# Patient Record
Sex: MALE | Race: WHITE
[De-identification: names, ages, dates, MRNs, and addresses within clinical notes are randomized per-mention and may not be internally consistent; named-entity substitution may affect disease eponyms.]

---

## 2021-03-01 ENCOUNTER — HOSPITAL ENCOUNTER (EMERGENCY)
Dept: HOSPITAL 56 - MW.ED | Age: 36
Discharge: HOME | End: 2021-03-01
Payer: COMMERCIAL

## 2021-03-01 DIAGNOSIS — R06.00: ICD-10-CM

## 2021-03-01 DIAGNOSIS — R07.89: Primary | ICD-10-CM

## 2021-03-01 DIAGNOSIS — R11.0: ICD-10-CM

## 2021-03-01 DIAGNOSIS — H92.02: ICD-10-CM

## 2021-03-01 LAB
BUN SERPL-MCNC: 20 MG/DL (ref 7–18)
CHLORIDE SERPL-SCNC: 102 MMOL/L (ref 98–107)
CO2 SERPL-SCNC: 24.3 MMOL/L (ref 21–32)
GLUCOSE SERPL-MCNC: 119 MG/DL (ref 74–106)
POTASSIUM SERPL-SCNC: 3.6 MMOL/L (ref 3.5–5.1)
SODIUM SERPL-SCNC: 139 MMOL/L (ref 136–148)

## 2021-03-01 PROCEDURE — 83735 ASSAY OF MAGNESIUM: CPT

## 2021-03-01 PROCEDURE — 36415 COLL VENOUS BLD VENIPUNCTURE: CPT

## 2021-03-01 PROCEDURE — 93005 ELECTROCARDIOGRAM TRACING: CPT

## 2021-03-01 PROCEDURE — 71045 X-RAY EXAM CHEST 1 VIEW: CPT

## 2021-03-01 PROCEDURE — 85610 PROTHROMBIN TIME: CPT

## 2021-03-01 PROCEDURE — 80048 BASIC METABOLIC PNL TOTAL CA: CPT

## 2021-03-01 PROCEDURE — 99285 EMERGENCY DEPT VISIT HI MDM: CPT

## 2021-03-01 PROCEDURE — 85379 FIBRIN DEGRADATION QUANT: CPT

## 2021-03-01 PROCEDURE — 84484 ASSAY OF TROPONIN QUANT: CPT

## 2021-03-01 PROCEDURE — 85025 COMPLETE CBC W/AUTO DIFF WBC: CPT

## 2021-03-01 NOTE — EDM.PDOC
ED HPI GENERAL MEDICAL PROBLEM





- General


Chief Complaint: Chest Pain


Stated Complaint: CHEST PAIN


Time Seen by Provider: 03/01/21 19:12





- History of Present Illness


INITIAL COMMENTS - FREE TEXT/NARRATIVE: 





CHIEF COMPLAINT(S): "I suddenly could not catch my breath "








HISTORY OF PRESENT ILLNESS: This is a 35-year-old man with a past medical 

history of Covid approximately 4 to 5 weeks ago who comes to the emergency 

department with a chief complaint of "I suddenly cannot catch my breath."  The 

patient states that he was sitting at his house and lying down when he suddenly 

could not catch his breath.  He states that it just felt like he could not take 

a full breath.  He states that at time he also felt some left-sided sharp chest 

pain rated 7-8 out of 10 associated with shortness of breath and nausea but 

denied any diaphoresis or vomiting.  He states that he went outside to the cold 

air but that did not seem to help his situation.  He denies any numbness, 

tingling, weakness, or headache.  He states that when this happened his ears got

red.  He states that this all started around 3 PM when he had similar symptoms. 

He denies any syncope, history of CAD or CHF.  He denies any recent travel or 

recent surgery or prior history of DVT or PE.  He does not know his family 

history given that he is adopted.  Of note: He had a earache on the left side 

last night which he took ibuprofen and it improved.  He denies any fevers or 

chills or cough.





 


REVIEW OF SYSTEMS: 





Constitutional: Denies fever, chills.


Eyes: Denies eye pain


Ears, Nose, Mouth, & Throat: Positive for left-sided earache


Cardiovascular: Positive for left-sided chest pain


Respiratory: Positive for shortness of breath.  Denies cough


Gastrointestinal: Positive for nausea.  Denies vomiting, diarrhea, hematochezia


Genitourinary: Denies hematuria


Skin:Denies a rash


MSK: Denies joint pain


Neurological: Denies blurred vision, numbness, tingling, weakness


Psychiatric: Denies depression








PAST MEDICAL HISTORY: As per history of present illness and as reviewed below 

otherwise noncontributory.





SURGICAL HISTORY: As per history of present illness and as reviewed below 

otherwise noncontributory.





SOCIAL HISTORY: As per history of present illness and as reviewed below 

otherwise noncontributory.





FAMILY HISTORY: As per history of present illness and as reviewed below 

otherwise noncontributory.








EXAMINATION OF ORGAN SYSTEMS/BODY AREAS: 





Constitutional: Blood pressure was 155/77, heart rate 104, respiratory rate 18 

with an oxygen saturation of 97% on room air.  Temperature 36.6


General: Young man who does not appear to be in acute distress


Psychiatric: Appears anxious but appropriate mood


Eyes: No scleral icterus or conjunctival erythema pupils are equal round 

reactive to light.


ENMT: Moist mucous membranes. No pharyngeal erythema no tonsillar exudates or 

swelling.  Bilateral tympanic membranes without any bulging or erythema.  

Bilateral nasal turbinates are clear without any drainage.


Cardiovascular: Tachycardic but regular.  No gallops, murmurs, or rubs.  

Bilateral upper extremity pulses symmetric and intact.  No peripheral edema.  No

JVD.


Respiratory: Lungs clear to auscultation bilaterally.  No wheezes, rales, or 

rhonchi.


Gastrointestinal: Soft, non-tender, non-distended.  Normoactive bowel sounds


Genitourinary: No suprapubic tenderness


Musculoskeletal: Normal range of motion.


Skin: No lesions or abrasions.


Neurological:     Alert, GCS 15 strength and sensation grossly intact in upper 

and lower extremities bilaterally.








MEDICAL DECISION MAKING AND COURSE IN THE ED WITH INTERPRETATION/REVIEW OF 

DIAGNOSTIC STUDIES: This is a 35-year-old man and with a recent Covid 19 and 5 

who comes to the emergency department with acute dyspnea with left-sided chest 

pain who is mildly tachycardic.  Given that the patient had Covid the patient is

at increased risk for PE.  We will obtain a D-dimer.  I did obtain an EKG which 

did not reveal any acute signs of ischemia.  Given his duration of symptoms we 

will obtain 1 troponin.  Patient is low risk for both pulmonary embolism and 

ACS.  Will obtain a chest x-ray.  We will provide the patient with aspirin by 

mouth and reevaluate.





Laboratory: CBC reveals a leukocytosis of 11.75 which is just above normal.  D-

dimer is negative.  Coags are within normal limits.  BMP is unremarkable.  

Magnesium is normal at 1.9.  Troponin is negative.





Time: 1907


Twelve-lead EKG interpreted by myself.  Normal sinus rhythm at a rate of 96 

beats per minute.  Left axis. IA interval is 154 ms. QRS duration is 98 ms. ST 

segments are normal without elevations or depressions.  No T wave inversions no 

Q waves present.  Hypertrophy not noted.  No prior EKGs in our system.  

Interpretation: Normal sinus rhythm





The radiological images were viewed by myself along with reading the report from

the radiologist.


Chest x-ray does not reveal any acute cardiopulmonary process.





At this time the patient's work-up was negative.  I did discuss the results with

the patient.  I did discuss with him at this time it is uncertain as what is 

causing his chest pain or shortness of breath however I do believe there is a 

degree of anxiety and panic attack given his flushed ears and hot sensation.  I 

recommended he follow-up with his primary care physician for further evaluation.

 He is to return for any new or worsening symptoms.





DISPOSITION: The patient was discharged home in stable condition. The patient 

will follow up with primary care physician within 1





CONDITION: Fair





PROCEDURES: None





FINAL IMPRESSION(S)/DIAGNOSES: 





1.  Acute chest pain


2.  Acute dyspnea





 





Jer Beaver M.D.


  ** chest pain


Pain Score (Numeric/FACES): 5





- Related Data


                                    Allergies











Allergy/AdvReac Type Severity Reaction Status Date / Time


 


No Known Allergies Allergy   Verified 03/01/21 19:12











Home Meds: 


                                    Home Meds





. [No Known Home Meds]  03/01/21 [History]











Past Medical History


HEENT History: Reports: None


Cardiovascular History: Reports: None


Respiratory History: Reports: None


Gastrointestinal History: Reports: None


Genitourinary History: Reports: None


Musculoskeletal History: Reports: None


Neurological History: Reports: None


Psychiatric History: Reports: None


Endocrine/Metabolic History: Reports: None


Insulin Pump Model and : None


Hematologic History: Reports: None


Immunologic History: Reports: None


Oncologic (Cancer) History: Reports: None


Dermatologic History: Reports: None





- Infectious Disease History


Infectious Disease History: Reports: None





- Past Surgical History


Head Surgeries/Procedures: Reports: None





Social & Family History





- Caffeine Use


Caffeine Use: Reports: Coffee, Energy Drinks, Soda, Tea





- Recreational Drug Use


Recreational Drug Use: No





ED ROS GENERAL





- Review of Systems


Review Of Systems: See Below





ED EXAM, GENERAL





- Physical Exam


Exam: See Below





Course





- Vital Signs


Last Recorded V/S: 


                                Last Vital Signs











Temp  36.6 C   03/01/21 19:10


 


Pulse  108 H  03/01/21 21:08


 


Resp  20   03/01/21 21:08


 


BP  144/99 H  03/01/21 21:08


 


Pulse Ox  99   03/01/21 21:08














- Orders/Labs/Meds


Labs: 


                                Laboratory Tests











  03/01/21 03/01/21 03/01/21 Range/Units





  19:40 19:40 19:40 


 


WBC  11.75 H    (4.0-11.0)  K/uL


 


RBC  4.87    (4.50-5.90)  M/uL


 


Hgb  15.5    (13.0-17.0)  g/dL


 


Hct  44.7    (38.0-50.0)  %


 


MCV  91.8    (80.0-98.0)  fL


 


MCH  31.8    (27.0-32.0)  pg


 


MCHC  34.7    (31.0-37.0)  g/dL


 


RDW Std Deviation  41.6    (28.0-62.0)  fl


 


RDW Coeff of Leif  12    (11.0-15.0)  %


 


Plt Count  265    (150-400)  K/uL


 


MPV  9.70    (7.40-12.00)  fL


 


Neut % (Auto)  71.0    (48.0-80.0)  %


 


Lymph % (Auto)  20.3    (16.0-40.0)  %


 


Mono % (Auto)  7.6    (0.0-15.0)  %


 


Eos % (Auto)  0.8    (0.0-7.0)  %


 


Baso % (Auto)  0.3    (0.0-1.5)  %


 


Neut # (Auto)  8.4 H    (1.4-5.7)  K/uL


 


Lymph # (Auto)  2.4    (0.6-2.4)  K/uL


 


Mono # (Auto)  0.9 H    (0.0-0.8)  K/uL


 


Eos # (Auto)  0.1    (0.0-0.7)  K/uL


 


Baso # (Auto)  0.0    (0.0-0.1)  K/uL


 


Nucleated RBC %  0.0    /100WBC


 


Nucleated RBCs #  0    K/uL


 


INR   1.09   


 


D-Dimer, Quantitative     (0.0-0.50)  mg/L FEU


 


Sodium    139  (136-148)  mmol/L


 


Potassium    3.6  (3.5-5.1)  mmol/L


 


Chloride    102  ()  mmol/L


 


Carbon Dioxide    24.3  (21.0-32.0)  mmol/L


 


BUN    20 H  (7.0-18.0)  mg/dL


 


Creatinine    1.0  (0.8-1.3)  mg/dL


 


Est Cr Clr Drug Dosing    109.81  mL/min


 


Estimated GFR (MDRD)    > 60.0  ml/min


 


Glucose    119 H  ()  mg/dL


 


Calcium    9.4  (8.5-10.1)  mg/dL


 


Magnesium    1.9  (1.8-2.4)  mg/dL


 


Troponin I    < 0.050  (0.000-0.056)  ng/mL














  03/01/21 Range/Units





  19:40 


 


WBC   (4.0-11.0)  K/uL


 


RBC   (4.50-5.90)  M/uL


 


Hgb   (13.0-17.0)  g/dL


 


Hct   (38.0-50.0)  %


 


MCV   (80.0-98.0)  fL


 


MCH   (27.0-32.0)  pg


 


MCHC   (31.0-37.0)  g/dL


 


RDW Std Deviation   (28.0-62.0)  fl


 


RDW Coeff of Leif   (11.0-15.0)  %


 


Plt Count   (150-400)  K/uL


 


MPV   (7.40-12.00)  fL


 


Neut % (Auto)   (48.0-80.0)  %


 


Lymph % (Auto)   (16.0-40.0)  %


 


Mono % (Auto)   (0.0-15.0)  %


 


Eos % (Auto)   (0.0-7.0)  %


 


Baso % (Auto)   (0.0-1.5)  %


 


Neut # (Auto)   (1.4-5.7)  K/uL


 


Lymph # (Auto)   (0.6-2.4)  K/uL


 


Mono # (Auto)   (0.0-0.8)  K/uL


 


Eos # (Auto)   (0.0-0.7)  K/uL


 


Baso # (Auto)   (0.0-0.1)  K/uL


 


Nucleated RBC %   /100WBC


 


Nucleated RBCs #   K/uL


 


INR   


 


D-Dimer, Quantitative  0.27  (0.0-0.50)  mg/L FEU


 


Sodium   (136-148)  mmol/L


 


Potassium   (3.5-5.1)  mmol/L


 


Chloride   ()  mmol/L


 


Carbon Dioxide   (21.0-32.0)  mmol/L


 


BUN   (7.0-18.0)  mg/dL


 


Creatinine   (0.8-1.3)  mg/dL


 


Est Cr Clr Drug Dosing   mL/min


 


Estimated GFR (MDRD)   ml/min


 


Glucose   ()  mg/dL


 


Calcium   (8.5-10.1)  mg/dL


 


Magnesium   (1.8-2.4)  mg/dL


 


Troponin I   (0.000-0.056)  ng/mL











Meds: 


Medications














Discontinued Medications














Generic Name Dose Route Start Last Admin





  Trade Name Nicki  PRN Reason Stop Dose Admin


 


Aspirin  324 mg  03/01/21 19:28  03/01/21 19:54





  Aspirin  PO  03/01/21 19:29  324 mg





  ONETIME ONE   Administration














Departure





- Departure


Time of Disposition: 20:48


Disposition: Home, Self-Care 01


Condition: Fair


Clinical Impression: 


 Atypical chest pain, Dyspnea








- Discharge Information


*PRESCRIPTION DRUG MONITORING PROGRAM REVIEWED*: No


*COPY OF PRESCRIPTION DRUG MONITORING REPORT IN PATIENT RISHI: No


Instructions:  Shortness of Breath, Adult, Easy-to-Read, Nonspecific Chest Pain,

 Adult, Easy-to-Read


Forms:  ED Department Discharge


Additional Instructions: 


You evaluate today on an emergent basis.  At this time your work-up was 

negative.  Your x-ray and labs were all normal.  We did perform a lab to look 

for possible blood clot in your lung and that was also negative.  At this time 

it is uncertain as to what is causing your chest pain or shortness of breath 

however there could be a degree of anxiety/panic attack.  I recommend follow-up 

with your primary care physician for further evaluation and monitoring.  If you 

have any worsening chest pain or shortness of breath or you pass out please 

return to the emergency department.





[facility]                                              

  


[address] [place] [id number]                                                             

                


Phone: [phone]                                                           

             


Fax: [phone]    





[address] [address]                                                             

                


Phone: [phone]                                                           

                                 


 Fax: [phone]





The patient is informed of any results of their evaluation and diagnostic workup

 and all questions are answered. They are given discharge instructions and 

return precautions. The patient is stable for discharge.  The patient states 

they understand and agree with the plan and that they will return if their 

symptoms get worse or if they have any new concerns.





The following information is given to patients seen in the emergency department 

who are being discharged to home. This information is to outline your options 

for follow-up care. We provide all patients seen in our emergency department 

with a follow-up referral.





The need for follow-up, as well as the timing and circumstances, are variable 

depending upon the specifics of your emergency department visit.





If you don't have a primary care physician on staff, we will provide you with a 

referral. We always advise you to contact your personal physician following an 

emergency department visit to inform them of the circumstance of the visit and 

for follow-up with them and/or the need for any referrals to a consulting 

specialist.





The emergency department will also refer you to a specialist when appropriate. 

This referral assures that you have the opportunity for follow-up care with a 

specialist. All of these measure are taken in an effort to provide you with 

optimal care, which includes your follow-up.





Under all circumstances we always encourage you to contact your private 

physician who remains a resource for coordinating your care. When calling for 

follow-up care, please make the office aware that this follow-up is from your 

recent emergency room visit. If for any reason you are refused follow-up, please

 contact the  Emergency 

Department at [phone] and asked to speak to the emergency department 

charge nurse.











Sepsis Event Note (ED)





- Evaluation


Sepsis Screening Result: No Definite Risk

## 2021-03-01 NOTE — CR
Indication:



Shortness of breath. Chest pain.



Technique:



AP portable view of the chest.



Comparison:



None



Findings:



Heart is normal in size. The lungs are clear. No infiltrate, pleural 

effusion, or pneumothorax is identified.



Impression:



No acute cardiopulmonary process



Dictated by Marcia Giles MD @ Mar  1 2021  7:55PM



Signed by Dr. Marcia Giles @ Mar  1 2021  8:01PM